# Patient Record
Sex: FEMALE | Race: WHITE | NOT HISPANIC OR LATINO
[De-identification: names, ages, dates, MRNs, and addresses within clinical notes are randomized per-mention and may not be internally consistent; named-entity substitution may affect disease eponyms.]

---

## 2017-01-25 ENCOUNTER — APPOINTMENT (OUTPATIENT)
Dept: SPINE | Facility: CLINIC | Age: 67
End: 2017-01-25

## 2017-01-25 ENCOUNTER — OUTPATIENT (OUTPATIENT)
Dept: OUTPATIENT SERVICES | Facility: HOSPITAL | Age: 67
LOS: 1 days | End: 2017-01-25
Payer: MEDICARE

## 2017-01-25 VITALS
BODY MASS INDEX: 23.79 KG/M2 | HEART RATE: 65 BPM | HEIGHT: 61 IN | WEIGHT: 126 LBS | DIASTOLIC BLOOD PRESSURE: 65 MMHG | SYSTOLIC BLOOD PRESSURE: 109 MMHG

## 2017-01-25 DIAGNOSIS — M99.83 OTHER BIOMECHANICAL LESIONS OF LUMBAR REGION: ICD-10-CM

## 2017-01-25 DIAGNOSIS — M54.5 LOW BACK PAIN: ICD-10-CM

## 2017-01-25 DIAGNOSIS — M43.9 DEFORMING DORSOPATHY, UNSPECIFIED: ICD-10-CM

## 2017-01-25 DIAGNOSIS — Z96.60 PRESENCE OF UNSPECIFIED ORTHOPEDIC JOINT IMPLANT: Chronic | ICD-10-CM

## 2017-01-25 DIAGNOSIS — M43.10 SPONDYLOLISTHESIS, SITE UNSPECIFIED: ICD-10-CM

## 2017-01-25 PROCEDURE — 72082 X-RAY EXAM ENTIRE SPI 2/3 VW: CPT | Mod: 26

## 2017-01-25 PROCEDURE — 72110 X-RAY EXAM L-2 SPINE 4/>VWS: CPT

## 2017-01-25 PROCEDURE — 72100 X-RAY EXAM L-S SPINE 2/3 VWS: CPT | Mod: 26,59

## 2017-01-25 PROCEDURE — 72082 X-RAY EXAM ENTIRE SPI 2/3 VW: CPT

## 2017-01-27 PROBLEM — M43.9 COMPRESSION DEFORMITY OF VERTEBRA: Status: ACTIVE | Noted: 2017-01-27

## 2017-01-27 PROBLEM — M43.10 RETROLISTHESIS OF VERTEBRAE: Status: ACTIVE | Noted: 2017-01-27

## 2017-01-27 PROBLEM — M99.83 LUMBAR FORAMINAL STENOSIS: Status: ACTIVE | Noted: 2017-01-27

## 2017-01-27 RX ORDER — CALCIUM CARBONATE/VITAMIN D3 600MG-5MCG
TABLET ORAL
Refills: 0 | Status: ACTIVE | COMMUNITY

## 2017-01-27 RX ORDER — MULTIVIT-MIN/FOLIC/VIT K/LYCOP 400-300MCG
1000 TABLET ORAL
Refills: 0 | Status: ACTIVE | COMMUNITY

## 2017-03-22 ENCOUNTER — APPOINTMENT (OUTPATIENT)
Dept: OPHTHALMOLOGY | Facility: CLINIC | Age: 67
End: 2017-03-22

## 2017-04-05 ENCOUNTER — APPOINTMENT (OUTPATIENT)
Dept: OPHTHALMOLOGY | Facility: CLINIC | Age: 67
End: 2017-04-05

## 2017-04-12 ENCOUNTER — APPOINTMENT (OUTPATIENT)
Dept: OPHTHALMOLOGY | Facility: CLINIC | Age: 67
End: 2017-04-12

## 2017-04-26 ENCOUNTER — OUTPATIENT (OUTPATIENT)
Dept: OUTPATIENT SERVICES | Facility: HOSPITAL | Age: 67
LOS: 1 days | End: 2017-04-26
Payer: MEDICARE

## 2017-04-26 ENCOUNTER — APPOINTMENT (OUTPATIENT)
Dept: OBGYN | Facility: CLINIC | Age: 67
End: 2017-04-26

## 2017-04-26 VITALS — DIASTOLIC BLOOD PRESSURE: 74 MMHG | WEIGHT: 128 LBS | SYSTOLIC BLOOD PRESSURE: 118 MMHG | BODY MASS INDEX: 24.19 KG/M2

## 2017-04-26 DIAGNOSIS — Z01.419 ENCOUNTER FOR GYNECOLOGICAL EXAMINATION (GENERAL) (ROUTINE) WITHOUT ABNORMAL FINDINGS: ICD-10-CM

## 2017-04-26 DIAGNOSIS — Z96.60 PRESENCE OF UNSPECIFIED ORTHOPEDIC JOINT IMPLANT: Chronic | ICD-10-CM

## 2017-04-26 DIAGNOSIS — N76.0 ACUTE VAGINITIS: ICD-10-CM

## 2017-04-26 DIAGNOSIS — Z87.891 PERSONAL HISTORY OF NICOTINE DEPENDENCE: ICD-10-CM

## 2017-04-26 PROCEDURE — G0439: CPT

## 2017-04-30 ENCOUNTER — FORM ENCOUNTER (OUTPATIENT)
Age: 67
End: 2017-04-30

## 2017-05-01 ENCOUNTER — APPOINTMENT (OUTPATIENT)
Dept: RADIOLOGY | Facility: IMAGING CENTER | Age: 67
End: 2017-05-01

## 2017-05-01 ENCOUNTER — OUTPATIENT (OUTPATIENT)
Dept: OUTPATIENT SERVICES | Facility: HOSPITAL | Age: 67
LOS: 1 days | End: 2017-05-01
Payer: MEDICARE

## 2017-05-01 DIAGNOSIS — M81.0 AGE-RELATED OSTEOPOROSIS WITHOUT CURRENT PATHOLOGICAL FRACTURE: ICD-10-CM

## 2017-05-01 DIAGNOSIS — Z96.60 PRESENCE OF UNSPECIFIED ORTHOPEDIC JOINT IMPLANT: Chronic | ICD-10-CM

## 2017-05-01 PROCEDURE — 77080 DXA BONE DENSITY AXIAL: CPT

## 2017-08-09 ENCOUNTER — APPOINTMENT (OUTPATIENT)
Dept: OPHTHALMOLOGY | Facility: CLINIC | Age: 67
End: 2017-08-09
Payer: MEDICARE

## 2017-08-09 PROCEDURE — 92012 INTRM OPH EXAM EST PATIENT: CPT

## 2017-08-16 ENCOUNTER — APPOINTMENT (OUTPATIENT)
Dept: OPHTHALMOLOGY | Facility: CLINIC | Age: 67
End: 2017-08-16

## 2018-04-11 ENCOUNTER — APPOINTMENT (OUTPATIENT)
Dept: OPHTHALMOLOGY | Facility: CLINIC | Age: 68
End: 2018-04-11
Payer: MEDICARE

## 2018-04-11 DIAGNOSIS — H26.9 UNSPECIFIED CATARACT: ICD-10-CM

## 2018-04-11 DIAGNOSIS — Z78.9 OTHER SPECIFIED HEALTH STATUS: ICD-10-CM

## 2018-04-11 PROCEDURE — 92133 CPTRZD OPH DX IMG PST SGM ON: CPT

## 2018-04-11 PROCEDURE — 92014 COMPRE OPH EXAM EST PT 1/>: CPT

## 2018-04-30 ENCOUNTER — APPOINTMENT (OUTPATIENT)
Dept: OBGYN | Facility: CLINIC | Age: 68
End: 2018-04-30

## 2018-05-14 ENCOUNTER — OUTPATIENT (OUTPATIENT)
Dept: OUTPATIENT SERVICES | Facility: HOSPITAL | Age: 68
LOS: 1 days | End: 2018-05-14
Payer: MEDICARE

## 2018-05-14 ENCOUNTER — APPOINTMENT (OUTPATIENT)
Dept: OBGYN | Facility: CLINIC | Age: 68
End: 2018-05-14
Payer: MEDICARE

## 2018-05-14 VITALS
WEIGHT: 127 LBS | DIASTOLIC BLOOD PRESSURE: 78 MMHG | SYSTOLIC BLOOD PRESSURE: 118 MMHG | BODY MASS INDEX: 23.98 KG/M2 | HEIGHT: 61 IN

## 2018-05-14 DIAGNOSIS — Z96.60 PRESENCE OF UNSPECIFIED ORTHOPEDIC JOINT IMPLANT: Chronic | ICD-10-CM

## 2018-05-14 DIAGNOSIS — Z87.898 PERSONAL HISTORY OF OTHER SPECIFIED CONDITIONS: ICD-10-CM

## 2018-05-14 DIAGNOSIS — M85.80 OTHER SPECIFIED DISORDERS OF BONE DENSITY AND STRUCTURE, UNSPECIFIED SITE: ICD-10-CM

## 2018-05-14 DIAGNOSIS — Z87.891 PERSONAL HISTORY OF NICOTINE DEPENDENCE: ICD-10-CM

## 2018-05-14 DIAGNOSIS — N76.0 ACUTE VAGINITIS: ICD-10-CM

## 2018-05-14 DIAGNOSIS — Z86.19 PERSONAL HISTORY OF OTHER INFECTIOUS AND PARASITIC DISEASES: ICD-10-CM

## 2018-05-14 PROCEDURE — 99397 PER PM REEVAL EST PAT 65+ YR: CPT | Mod: GC

## 2018-05-14 PROCEDURE — G0439: CPT

## 2018-05-14 RX ORDER — DICLOFENAC EPOLAMINE 0.01 G/1
1.3 SYSTEM TOPICAL
Refills: 0 | Status: DISCONTINUED | COMMUNITY
End: 2018-05-14

## 2018-05-15 ENCOUNTER — FORM ENCOUNTER (OUTPATIENT)
Age: 68
End: 2018-05-15

## 2018-05-16 ENCOUNTER — APPOINTMENT (OUTPATIENT)
Dept: ULTRASOUND IMAGING | Facility: IMAGING CENTER | Age: 68
End: 2018-05-16
Payer: MEDICARE

## 2018-05-16 ENCOUNTER — OUTPATIENT (OUTPATIENT)
Dept: OUTPATIENT SERVICES | Facility: HOSPITAL | Age: 68
LOS: 1 days | End: 2018-05-16
Payer: MEDICARE

## 2018-05-16 DIAGNOSIS — Z96.60 PRESENCE OF UNSPECIFIED ORTHOPEDIC JOINT IMPLANT: Chronic | ICD-10-CM

## 2018-05-16 DIAGNOSIS — Z01.411 ENCOUNTER FOR GYNECOLOGICAL EXAMINATION (GENERAL) (ROUTINE) WITH ABNORMAL FINDINGS: ICD-10-CM

## 2018-05-16 PROCEDURE — 76830 TRANSVAGINAL US NON-OB: CPT

## 2018-05-16 PROCEDURE — 76830 TRANSVAGINAL US NON-OB: CPT | Mod: 26

## 2018-05-24 DIAGNOSIS — M85.80 OTHER SPECIFIED DISORDERS OF BONE DENSITY AND STRUCTURE, UNSPECIFIED SITE: ICD-10-CM

## 2018-05-24 DIAGNOSIS — Z01.411 ENCOUNTER FOR GYNECOLOGICAL EXAMINATION (GENERAL) (ROUTINE) WITH ABNORMAL FINDINGS: ICD-10-CM

## 2018-05-24 DIAGNOSIS — Z01.419 ENCOUNTER FOR GYNECOLOGICAL EXAMINATION (GENERAL) (ROUTINE) WITHOUT ABNORMAL FINDINGS: ICD-10-CM

## 2018-05-24 DIAGNOSIS — Z87.891 PERSONAL HISTORY OF NICOTINE DEPENDENCE: ICD-10-CM

## 2018-06-01 ENCOUNTER — APPOINTMENT (OUTPATIENT)
Dept: PULMONOLOGY | Facility: CLINIC | Age: 68
End: 2018-06-01
Payer: MEDICARE

## 2018-06-01 VITALS
WEIGHT: 126 LBS | DIASTOLIC BLOOD PRESSURE: 78 MMHG | HEART RATE: 57 BPM | BODY MASS INDEX: 25.4 KG/M2 | SYSTOLIC BLOOD PRESSURE: 140 MMHG | HEIGHT: 59 IN | OXYGEN SATURATION: 97 %

## 2018-06-01 DIAGNOSIS — R06.2 WHEEZING: ICD-10-CM

## 2018-06-01 DIAGNOSIS — J45.909 UNSPECIFIED ASTHMA, UNCOMPLICATED: ICD-10-CM

## 2018-06-01 PROCEDURE — 94729 DIFFUSING CAPACITY: CPT

## 2018-06-01 PROCEDURE — 94060 EVALUATION OF WHEEZING: CPT

## 2018-06-01 PROCEDURE — 99203 OFFICE O/P NEW LOW 30 MIN: CPT | Mod: 25

## 2018-06-01 PROCEDURE — 94727 GAS DIL/WSHOT DETER LNG VOL: CPT

## 2018-07-26 PROBLEM — M54.5 LUMBAR BACK PAIN: Status: ACTIVE | Noted: 2017-01-25

## 2018-10-03 ENCOUNTER — APPOINTMENT (OUTPATIENT)
Dept: OPHTHALMOLOGY | Facility: CLINIC | Age: 68
End: 2018-10-03

## 2018-11-19 ENCOUNTER — APPOINTMENT (OUTPATIENT)
Dept: OPHTHALMOLOGY | Facility: CLINIC | Age: 68
End: 2018-11-19
Payer: MEDICARE

## 2018-11-19 PROCEDURE — 76514 ECHO EXAM OF EYE THICKNESS: CPT

## 2018-11-19 PROCEDURE — 92012 INTRM OPH EXAM EST PATIENT: CPT

## 2019-05-15 ENCOUNTER — APPOINTMENT (OUTPATIENT)
Dept: OBGYN | Facility: CLINIC | Age: 69
End: 2019-05-15

## 2019-05-15 ENCOUNTER — APPOINTMENT (OUTPATIENT)
Dept: OBGYN | Facility: CLINIC | Age: 69
End: 2019-05-15
Payer: MEDICARE

## 2019-05-15 VITALS
SYSTOLIC BLOOD PRESSURE: 132 MMHG | DIASTOLIC BLOOD PRESSURE: 80 MMHG | BODY MASS INDEX: 25 KG/M2 | HEIGHT: 59 IN | WEIGHT: 124 LBS

## 2019-05-15 VITALS
HEIGHT: 59 IN | BODY MASS INDEX: 25 KG/M2 | DIASTOLIC BLOOD PRESSURE: 80 MMHG | SYSTOLIC BLOOD PRESSURE: 132 MMHG | WEIGHT: 124 LBS

## 2019-05-15 DIAGNOSIS — Z01.419 ENCOUNTER FOR GYNECOLOGICAL EXAMINATION (GENERAL) (ROUTINE) W/OUT ABNORMAL FINDINGS: ICD-10-CM

## 2019-05-15 DIAGNOSIS — Z01.411 ENCOUNTER FOR GYNECOLOGICAL EXAMINATION (GENERAL) (ROUTINE) WITH ABNORMAL FINDINGS: ICD-10-CM

## 2019-05-15 PROCEDURE — 99397 PER PM REEVAL EST PAT 65+ YR: CPT

## 2019-05-15 NOTE — PHYSICAL EXAM
[Awake] : awake [Alert] : alert [Acute Distress] : no acute distress [Nipple Discharge] : no nipple discharge [Mass] : no breast mass [Soft] : soft [Tender] : non tender [Axillary LAD] : no axillary lymphadenopathy [Oriented x3] : oriented to person, place, and time [No Bleeding] : there was no active vaginal bleeding [Normal] : uterus [Uterine Adnexae] : were not tender and not enlarged

## 2019-05-15 NOTE — HISTORY OF PRESENT ILLNESS
[Last Bone Density ___] : Last bone density studies [unfilled] [Last Colonoscopy ___] : Last colonoscopy [unfilled] [Last Pap ___] : Last cervical pap smear was [unfilled] [Postmenopausal] : is postmenopausal

## 2019-05-20 ENCOUNTER — APPOINTMENT (OUTPATIENT)
Dept: OPHTHALMOLOGY | Facility: CLINIC | Age: 69
End: 2019-05-20
Payer: MEDICARE

## 2019-05-20 DIAGNOSIS — H40.003 PREGLAUCOMA, UNSPECIFIED, BILATERAL: ICD-10-CM

## 2019-05-20 PROCEDURE — 92015 DETERMINE REFRACTIVE STATE: CPT

## 2019-05-20 PROCEDURE — ZZZZZ: CPT

## 2019-05-20 PROCEDURE — 92014 COMPRE OPH EXAM EST PT 1/>: CPT

## 2019-05-20 PROCEDURE — 92083 EXTENDED VISUAL FIELD XM: CPT

## 2019-09-05 ENCOUNTER — APPOINTMENT (OUTPATIENT)
Dept: OPHTHALMOLOGY | Facility: CLINIC | Age: 69
End: 2019-09-05
Payer: MEDICARE

## 2019-09-05 ENCOUNTER — NON-APPOINTMENT (OUTPATIENT)
Age: 69
End: 2019-09-05

## 2019-09-05 PROCEDURE — 92012 INTRM OPH EXAM EST PATIENT: CPT

## 2019-10-29 ENCOUNTER — RESULT REVIEW (OUTPATIENT)
Age: 69
End: 2019-10-29

## 2020-04-01 ENCOUNTER — APPOINTMENT (OUTPATIENT)
Dept: OPHTHALMOLOGY | Facility: CLINIC | Age: 70
End: 2020-04-01

## 2020-11-05 ENCOUNTER — NON-APPOINTMENT (OUTPATIENT)
Age: 70
End: 2020-11-05

## 2020-11-05 ENCOUNTER — APPOINTMENT (OUTPATIENT)
Dept: OPHTHALMOLOGY | Facility: CLINIC | Age: 70
End: 2020-11-05
Payer: MEDICARE

## 2020-11-05 PROCEDURE — 92133 CPTRZD OPH DX IMG PST SGM ON: CPT

## 2020-11-05 PROCEDURE — 92014 COMPRE OPH EXAM EST PT 1/>: CPT

## 2020-11-05 PROCEDURE — 99072 ADDL SUPL MATRL&STAF TM PHE: CPT

## 2020-11-05 PROCEDURE — 92083 EXTENDED VISUAL FIELD XM: CPT

## 2020-11-12 ENCOUNTER — RESULT REVIEW (OUTPATIENT)
Age: 70
End: 2020-11-12

## 2021-03-26 ENCOUNTER — NON-APPOINTMENT (OUTPATIENT)
Age: 71
End: 2021-03-26

## 2021-10-02 ENCOUNTER — EMERGENCY (EMERGENCY)
Facility: HOSPITAL | Age: 71
LOS: 1 days | Discharge: HOME | End: 2021-10-02
Attending: EMERGENCY MEDICINE | Admitting: EMERGENCY MEDICINE
Payer: MEDICARE

## 2021-10-02 VITALS
RESPIRATION RATE: 18 BRPM | SYSTOLIC BLOOD PRESSURE: 122 MMHG | HEART RATE: 58 BPM | DIASTOLIC BLOOD PRESSURE: 64 MMHG | OXYGEN SATURATION: 97 %

## 2021-10-02 VITALS
WEIGHT: 119.93 LBS | HEART RATE: 57 BPM | HEIGHT: 59 IN | TEMPERATURE: 98 F | SYSTOLIC BLOOD PRESSURE: 144 MMHG | RESPIRATION RATE: 18 BRPM | OXYGEN SATURATION: 95 % | DIASTOLIC BLOOD PRESSURE: 98 MMHG

## 2021-10-02 DIAGNOSIS — Z96.60 PRESENCE OF UNSPECIFIED ORTHOPEDIC JOINT IMPLANT: Chronic | ICD-10-CM

## 2021-10-02 PROCEDURE — 90471 IMMUNIZATION ADMIN: CPT

## 2021-10-02 PROCEDURE — 72125 CT NECK SPINE W/O DYE: CPT | Mod: MA

## 2021-10-02 PROCEDURE — 99284 EMERGENCY DEPT VISIT MOD MDM: CPT | Mod: 25

## 2021-10-02 PROCEDURE — 12002 RPR S/N/AX/GEN/TRNK2.6-7.5CM: CPT

## 2021-10-02 PROCEDURE — 90715 TDAP VACCINE 7 YRS/> IM: CPT

## 2021-10-02 PROCEDURE — 70450 CT HEAD/BRAIN W/O DYE: CPT | Mod: MA

## 2021-10-02 PROCEDURE — 73502 X-RAY EXAM HIP UNI 2-3 VIEWS: CPT | Mod: 26,RT

## 2021-10-02 PROCEDURE — 73502 X-RAY EXAM HIP UNI 2-3 VIEWS: CPT

## 2021-10-02 PROCEDURE — 70450 CT HEAD/BRAIN W/O DYE: CPT | Mod: 26,MA

## 2021-10-02 PROCEDURE — 99285 EMERGENCY DEPT VISIT HI MDM: CPT | Mod: 25

## 2021-10-02 PROCEDURE — 72125 CT NECK SPINE W/O DYE: CPT | Mod: 26,MA

## 2021-10-02 RX ORDER — TETANUS TOXOID, REDUCED DIPHTHERIA TOXOID AND ACELLULAR PERTUSSIS VACCINE, ADSORBED 5; 2.5; 8; 8; 2.5 [IU]/.5ML; [IU]/.5ML; UG/.5ML; UG/.5ML; UG/.5ML
0.5 SUSPENSION INTRAMUSCULAR ONCE
Refills: 0 | Status: COMPLETED | OUTPATIENT
Start: 2021-10-02 | End: 2021-10-02

## 2021-10-02 RX ORDER — ACETAMINOPHEN 500 MG
650 TABLET ORAL ONCE
Refills: 0 | Status: COMPLETED | OUTPATIENT
Start: 2021-10-02 | End: 2021-10-02

## 2021-10-02 RX ORDER — ALPRAZOLAM 0.25 MG
0.25 TABLET ORAL ONCE
Refills: 0 | Status: DISCONTINUED | OUTPATIENT
Start: 2021-10-02 | End: 2021-10-02

## 2021-10-02 RX ADMIN — Medication 650 MILLIGRAM(S): at 11:04

## 2021-10-02 RX ADMIN — Medication 0.25 MILLIGRAM(S): at 11:04

## 2021-10-02 RX ADMIN — TETANUS TOXOID, REDUCED DIPHTHERIA TOXOID AND ACELLULAR PERTUSSIS VACCINE, ADSORBED 0.5 MILLILITER(S): 5; 2.5; 8; 8; 2.5 SUSPENSION INTRAMUSCULAR at 11:04

## 2021-10-02 NOTE — ED PROVIDER NOTE - ATTENDING CONTRIBUTION TO CARE
Pt s/p trip and fall down 5-7 steps, hit L side of head and R side of pelvis, ambulatory afterwards, small bleeding from scalp laceration.  Pt awake, alert, anxious, no c/t/l spine td, 5/5 motor UE and LE.

## 2021-10-02 NOTE — ED ADULT NURSE NOTE - OBJECTIVE STATEMENT
71 yr old female to ED via EMS s/p fall down 5-7 steps. Pt tripped was impatient to wait for the elevator Denies LOC head lac C/o l hip pain Was able to get up Pt awake alert and orientedx4 Resp even and nonlab denies chest pain or SOb Denies dizziness or lightheadedness GCS 15 Responds to verbal and tactile stimuli.

## 2021-10-02 NOTE — ED PROVIDER NOTE - CLINICAL SUMMARY MEDICAL DECISION MAKING FREE TEXT BOX
Brenden Glover, PGY-2- 71 year old female with a pmhx of anxiety, chronic bronchitis, hypothyroid, s/p fall that occurred prior to arrival. Pt slipped down stairs, hit head, was able to stand up after injury. With pain at back of head, rt hip. Plan to obtain ct head, ct neck, tetanus shot, tylenol, xanax for anxiety. Will need laceration cleaned and likely repaired

## 2021-10-02 NOTE — ED PROVIDER NOTE - OBJECTIVE STATEMENT
71 year old female with a pmhx of hypothyroid, chronic bronchitis, anxiety, is brought to ED by EMS for evaluation of fall. Pt reports was rushing down stairs, slipped, and fell down 6 stairs and hit her head. No LOC. Pt noted blood to back of head. Was able to pull herself up to standing, unsure if she walked. Noting pain to back of head, rt hip. No neck pain. Denies any recent fever, chills, cp, sob, abd pain, vomiting, diarrhea, numbness, tingling. On ASA daily. No other recent falls. Last tetanus unknown.

## 2021-10-02 NOTE — ED PROVIDER NOTE - PHYSICAL EXAMINATION
General: well appearing, no acute distress, AOx3  Skin: no rash, no pallor  Head: normocephalic, open wound to left posterior scalp, bleeding controlled   Eyes: clear conjunctiva, EOMI, PERRL  ENMT: airway patent, no nasal discharge  Cardiovascular: normal rate, normal rhythm, S1/S2  Pulmonary: clear to auscultation bilaterally, no rales, rhonchi, or wheeze  Abdomen: soft, nontender  Musculoskeletal: moving extremities well, no deformity, no c/t/l spine tenderness, no tenderness over chest wall, normal ROM of hips/knees/ankles/shoulders/elbows/wrists, normal ROM of neck   Psych: normal mood, appears anxious

## 2021-10-02 NOTE — ED PROVIDER NOTE - NS ED ROS FT
General: no fever, no chills  Eyes: no vision changes, no eye pain  Cardiovascular: no chest pain, no edema  Respiratory: no cough, no shortness of breath  Gastrointestinal: no abdominal pain, no nausea, no vomiting, no diarrhea  Genitourinary: no dysuria, no hematuria  Musculoskeletal: no muscle pain, +rt hip pain, head pain  Skin: no rash, no lesions  Neuro: no numbness, no tingling  Psych: no depression, no anxiety

## 2021-10-02 NOTE — ED ADULT NURSE NOTE - NSIMPLEMENTINTERV_GEN_ALL_ED
Implemented All Fall Risk Interventions:  [place] to call system. Call bell, personal items and telephone within reach. Instruct patient to call for assistance. Room bathroom lighting operational. Non-slip footwear when patient is off stretcher. Physically safe environment: no spills, clutter or unnecessary equipment. Stretcher in lowest position, wheels locked, appropriate side rails in place. Provide visual cue, wrist band, yellow gown, etc. Monitor gait and stability. Monitor for mental status changes and reorient to person, place, and time. Review medications for side effects contributing to fall risk. Reinforce activity limits and safety measures with patient and family.

## 2021-10-02 NOTE — ED ADULT NURSE NOTE - CAS EDP DISCH TYPE
Consent (Nose)/Introductory Paragraph: The rationale for Mohs was explained to the patient and consent was obtained. The risks, benefits and alternatives to therapy were discussed in detail. Specifically, the risks of nasal deformity, changes in the flow of air through the nose, infection, scarring, bleeding, prolonged wound healing, incomplete removal, allergy to anesthesia, nerve injury and recurrence were addressed. Prior to the procedure, the treatment site was clearly identified and confirmed by the patient using a hand mirror. All components of Universal Protocol/PAUSE Rule completed. Home

## 2021-10-02 NOTE — ED PROVIDER NOTE - NSICDXPASTMEDICALHX_GEN_ALL_CORE_FT
PAST MEDICAL HISTORY:  Anxiety     COPD (chronic obstructive pulmonary disease)     Glaucoma     Hyperlipemia     Hypothyroid     Osteoporosis, postmenopausal

## 2021-10-02 NOTE — ED PROVIDER NOTE - PROGRESS NOTE DETAILS
Brenden Glover, PGY-2- Patient improved in the ED. Laceration repaired. Pt to have staples removed in 5 days. Pt ambulatory in ED. Discussed results of work up with patient. Patient agrees with plan to discharge home. All questions answered regarding plan. Strict return precautions given.

## 2021-10-02 NOTE — ED PROVIDER NOTE - PATIENT PORTAL LINK FT
You can access the FollowMyHealth Patient Portal offered by [facility] by registering at the following website: [email or web address]. By joining [employer] [facility]’s FollowMyHealth portal, you will also be able to view your health information using other applications (apps) compatible with our system.

## 2021-10-02 NOTE — ED PROVIDER NOTE - NSFOLLOWUPINSTRUCTIONS_ED_ALL_ED_FT
1) Follow up with your PCP within 1 week of being seen in the Emergency Department   2) Return to the ED immediately for new or worsening symptoms severe pain, difficulty breathing, unable to walk, fever, redness surrounding wound, drainage from wound   3) Please continue to take any home medications as prescribed  4) Your test results from your ED visit were discussed with you prior to discharge  5) You were provided with a copy of your test results  6) Please have the staples (5) removed in 7 days. You can go to any Emergency Department, urgent care, or your doctor's office. Please take Tylenol as needed for pain. Please follow all pharmacy packaging instructions and warnings.  7) Please see info sheet on staples 1) Follow up with your PCP within 1 week of being seen in the Emergency Department   2) Return to the ED immediately for new or worsening symptoms severe pain, difficulty breathing, unable to walk, fever, redness surrounding wound, drainage from wound   3) Please continue to take any home medications as prescribed  4) Your test results from your ED visit were discussed with you prior to discharge  5) You were provided with a copy of your test results  6) Please have the staples (5) removed in 7 days. You can go to any Emergency Department, urgent care, or your doctor's office. Please take Tylenol as needed for pain. Please follow all pharmacy packaging instructions and warnings. Do not get staples wet.  7) Please see info sheet on staples

## 2021-12-27 ENCOUNTER — TRANSCRIPTION ENCOUNTER (OUTPATIENT)
Age: 71
End: 2021-12-27

## 2022-07-14 ENCOUNTER — EMERGENCY (EMERGENCY)
Facility: HOSPITAL | Age: 72
LOS: 1 days | Discharge: HOME | End: 2022-07-14
Attending: STUDENT IN AN ORGANIZED HEALTH CARE EDUCATION/TRAINING PROGRAM | Admitting: STUDENT IN AN ORGANIZED HEALTH CARE EDUCATION/TRAINING PROGRAM

## 2022-07-14 VITALS
TEMPERATURE: 98 F | HEART RATE: 58 BPM | DIASTOLIC BLOOD PRESSURE: 68 MMHG | OXYGEN SATURATION: 98 % | SYSTOLIC BLOOD PRESSURE: 135 MMHG | RESPIRATION RATE: 16 BRPM | HEIGHT: 59 IN

## 2022-07-14 DIAGNOSIS — Z96.60 PRESENCE OF UNSPECIFIED ORTHOPEDIC JOINT IMPLANT: Chronic | ICD-10-CM

## 2022-07-14 PROCEDURE — 99285 EMERGENCY DEPT VISIT HI MDM: CPT

## 2022-07-14 RX ORDER — LIDOCAINE 4 G/100G
1 CREAM TOPICAL ONCE
Refills: 0 | Status: COMPLETED | OUTPATIENT
Start: 2022-07-14 | End: 2022-07-14

## 2022-07-14 NOTE — ED PROVIDER NOTE - ENMT, MLM
Airway patent, head NC/AT, no ecchymosis noted to face, mild ttp to top of head, no hematoma or ecchymosis noted, no deformity

## 2022-07-14 NOTE — ED PROVIDER NOTE - PROGRESS NOTE DETAILS
Blair Perry MD PGY-2  Received signout on this pt who had +HS, -LOC after being struck by a mirror. CT read unremarkable for acute trauma or fracture. WIll D/C with OP follow-up

## 2022-07-14 NOTE — ED PROVIDER NOTE - OBJECTIVE STATEMENT
73 y/o F PMH COPD c/o posterior head pain and neck pain s/p head injury today. Pt states she was putting clothes away in her drawer when the mirror on top of the dresser fell on her head and neck, pt able to lift off of her and place back on dresser but states pain has been gradually worsening since incident. Denies fever, LOC, change in vision, CP, SOB, abdominal pain, N/V/D, numbness/tingling/weakness.

## 2022-07-14 NOTE — ED PROVIDER NOTE - EXTREMITY EXAM
b/l UE: no obvious swelling, erythema, or deformity, no TTP, FROM, sensation and strength intact/no deformity, pain or tenderness, no restriction of movement

## 2022-07-14 NOTE — ED PROVIDER NOTE - ATTENDING APP SHARED VISIT CONTRIBUTION OF CARE
71 yo f past medical history copd anxiety, hypothyroidism present after mirror fell off dresser hitting her in the head. reports pain on head since. no loc, n/v, numbness, weakness, vision or hearing changes. denies AC use. exam as above. plan: symptom relief prn, CT, reassess.

## 2022-07-14 NOTE — ED PROVIDER NOTE - NSICDXFAMILYHX_GEN_ALL_CORE_FT
Can you clarify as to which medication she might be referring to? Cymbalta? CBD oil?    FAMILY HISTORY:  Family history of breast cancer

## 2022-07-14 NOTE — ED PROVIDER NOTE - NS ED ATTENDING STATEMENT MOD
This was a shared visit with the EDMAR. I reviewed and verified the documentation and independently performed the documented:

## 2022-07-14 NOTE — ED PROVIDER NOTE - CLINICAL SUMMARY MEDICAL DECISION MAKING FREE TEXT BOX
pt with minor head trauma c/o head pain and neck pain, given age will check ct head and neck, pt otherwise well appearing, offered pain control but pt decliend however does except lido patch

## 2022-07-14 NOTE — ED PROVIDER NOTE - NS CPE EDP MUSC CERVICAL LOC
no midline tenderness. no deformity. bilateral paraspinal muscle tenderness to palpation over proximal trapexius and cervical paraspinal muscles. FROM. Pain reproducible with ROM

## 2022-07-14 NOTE — ED ADULT TRIAGE NOTE - CHIEF COMPLAINT QUOTE
Patients dresser mirror tipped over and hit her head today when she opened the drawer. Patient has bump on the top of her head and some neck pain. , no laceration, no loc, no blood thinners.

## 2022-07-14 NOTE — ED PROVIDER NOTE - PATIENT PORTAL LINK FT
You can access the FollowMyHealth Patient Portal offered by [facility] by registering at the following website: [email or web address]. By joining [employer]’s FollowMyHealth portal, you will also be able to view your health information using other applications (apps) compatible with our system.

## 2022-07-14 NOTE — ED PROVIDER NOTE - NSFOLLOWUPINSTRUCTIONS_ED_ALL_ED_FT
You were seen in the Emergency Department after being struck by a falling mirror. Fortunately, we did not identify any life-threatening head or neck trauma that would otherwise require further evaluation in the hospital. Some people experience vague neurologic symptoms after being struck in the head, which may be symptoms of a concussion.    You should return to the Emergency Department if you experience any weakness, numbness, worsening headache, vision changes, or other new or concerning symptoms that you would like evaluated.    [facility]  [facility] [place] [facility] [place]  [address], [address]  [place] [address]  [phone]    What is a concussion?  A concussion is a brain injury that affects how your brain works. It can happen when your brain gets bounced around in your skull after a fall or hit to the head.    What should I do if I think I have a concussion?  Report it    If you play a sport tell your  and parent if you think you or one of your teammates may have a concussion. You won’t play your best if you are not feeling well, and playing with a concussion is dangerous. Encourage your teammates to also report their symptoms.    Get checked out by a doctor    If you think you have a concussion, do not return to play any sports on the day of the injury. Only a doctor or other health care provider can tell if you have a concussion and when it’s OK to return to school and play    Give your brain time to heal    Most concussions get better within a couple of weeks. For some, a concussion can make everyday activities, such as going to school, harder. You may need extra help getting back to your normal activities. Be sure to update your parents and doctor about how you are feeling.    Regarding sports good teammates know: It's better to miss one game than the whole season.    How can I tell if I have a concussion?  You may have a concussion if you have any of these symptoms after a bump, blow, or jolt to the head or body:    * Get a headache.  * Feel dizzy, sluggish, or foggy.  * Be bothered by light or noise.  * Have double or blurry vision.  * Vomit or feel sick to your stomach.  * Have trouble focusing or problems remembering.  * Feel more emotional or “down.”  * Feel confused.  * Have problems with sleep.    A concussion feels different to each person, so it’s important to tell your parents or your doctor how you feel. You might notice concussion symptoms right away, but sometimes it takes hours or days until you notice that something isn’t right.    How can I help my team (For athletes) ?  Protect your brain    All your teammates should avoid hits to the head and follow the rules for safe play to lower chances of getting a concussion.    Be a team player    If one of your teammates has a concussion, tell them that they’re an important part of the team, and they should take the time they need to get better.    The information provided in this document or through linkages to other sites is not a substitute for medical or professional care. Questions about diagnosis and treatment for concussion should be directed to a physician or other health care provider.    To learn more, go towww.cdc.gov/HEADSUP    Centers for Disease Control and Prevention    National Center for Injury Prevention and Control    ADDITIONAL NOTES AND INSTRUCTIONS    Please follow up with your Primary MD in 24-48 hr.  Seek immediate medical care for any new/worsening signs or symptoms.

## 2022-07-15 VITALS
SYSTOLIC BLOOD PRESSURE: 149 MMHG | OXYGEN SATURATION: 100 % | DIASTOLIC BLOOD PRESSURE: 76 MMHG | RESPIRATION RATE: 17 BRPM | HEART RATE: 64 BPM | TEMPERATURE: 98 F

## 2022-07-15 PROCEDURE — G1004: CPT

## 2022-07-15 PROCEDURE — 72125 CT NECK SPINE W/O DYE: CPT | Mod: 26,MG

## 2022-07-15 PROCEDURE — 70450 CT HEAD/BRAIN W/O DYE: CPT | Mod: 26,ME

## 2022-07-15 RX ADMIN — LIDOCAINE 1 PATCH: 4 CREAM TOPICAL at 00:01

## 2022-10-12 ENCOUNTER — APPOINTMENT (OUTPATIENT)
Dept: ORTHOPEDIC SURGERY | Facility: CLINIC | Age: 72
End: 2022-10-12
Payer: MEDICARE

## 2022-12-20 ENCOUNTER — APPOINTMENT (OUTPATIENT)
Dept: ORTHOPEDIC SURGERY | Facility: CLINIC | Age: 72
End: 2022-12-20
Payer: MEDICARE

## 2022-12-27 ENCOUNTER — APPOINTMENT (OUTPATIENT)
Dept: ORTHOPEDIC SURGERY | Facility: CLINIC | Age: 72
End: 2022-12-27
Payer: MEDICARE

## 2023-01-03 ENCOUNTER — APPOINTMENT (OUTPATIENT)
Dept: ORTHOPEDIC SURGERY | Facility: CLINIC | Age: 73
End: 2023-01-03
Payer: MEDICARE

## 2023-01-10 ENCOUNTER — APPOINTMENT (OUTPATIENT)
Dept: ORTHOPEDIC SURGERY | Facility: CLINIC | Age: 73
End: 2023-01-10
Payer: MEDICARE

## 2023-01-17 ENCOUNTER — APPOINTMENT (OUTPATIENT)
Dept: ORTHOPEDIC SURGERY | Facility: CLINIC | Age: 73
End: 2023-01-17
Payer: MEDICARE

## 2023-01-17 VITALS — BODY MASS INDEX: 22.78 KG/M2 | WEIGHT: 113 LBS | HEIGHT: 59 IN

## 2023-01-17 DIAGNOSIS — M24.541 CONTRACTURE, RIGHT HAND: ICD-10-CM

## 2023-01-17 PROCEDURE — 99203 OFFICE O/P NEW LOW 30 MIN: CPT | Mod: 25,57

## 2023-01-17 PROCEDURE — 26340 MANIPULATE FINGER W/ANESTH: CPT | Mod: 59,F2

## 2023-01-17 PROCEDURE — 20550 NJX 1 TENDON SHEATH/LIGAMENT: CPT | Mod: 59,F3

## 2023-01-17 PROCEDURE — 20550 NJX 1 TENDON SHEATH/LIGAMENT: CPT | Mod: 59,F2

## 2023-01-17 PROCEDURE — 20550 NJX 1 TENDON SHEATH/LIGAMENT: CPT | Mod: F7

## 2023-01-17 NOTE — PROCEDURE
[] : The injection was done in 2 phases with the first phase being subcutaneous injection of lidocaine 1.5 cc subcutaneously as anesthetic. When adequate level of anesthesia was achieved, the Celestone injection was undertaken. [3rd] : 3rd finger [4th] : 4th finger

## 2023-01-19 NOTE — PHYSICAL EXAM
[de-identified] : Right hand\par At rest: long finger locked in flexion\par Long finger passively extendable with triggering.\par PIP 3 contracture 30 degrees\par DIP 3 contracture 15 degrees.  \par There is no visible or palpable Dupuytren's cords in the long finger or in any other finger.\par No notable Dupuytren's cords in palm other than nodules at DPC.\par Index, ring, little fingers have full active flexion extension without triggering\par There is no other A1 pulley tenderness or triggering in any other finger, right hand.\par Full active flexion extension of the finger\par Basal joint prominent with first metacarpal adduction and stiffness.  Manipulation slight discomfort.\par No pertinent MP and PIP joint contributory findings. DIP joints: Heberden's nodes all digits\par After cortisone injection from local anesthetic right long finger could be fully extended all 3 joints simultaneously.\par Double triggering was evident.\par \par Left hand\par Long finger in flexion at rest.\par PIP contracture 60 degrees.\par PIP extendable with triggering.\par Ring finger active triggering.  PIP 4: -30 degrees.\par Thumb, index, little finger A1 pulleys nontender with no triggering and full extension \par basal joint less prominent on the left compared to right; no pain with basal joint manipulation\par No pertinent MP and PIP joint contributory findings. DIP joints: Heberden's nodes all digits\par \par **After cortisone injection with local anesthetic long finger was passively extendable fully at all 3 joints\par **However active extension was limited to -30 degrees at the PIP joint.\par **Ring finger was fully extendable.**\par \par Neurologic: Median, ulnar, and radial motor and sensory are intact. \par Phalen's test with median nerve compression: Negative bilaterally.\par Skin: No cyanosis, clubbing, or rashes.\par Vascular: Radial pulses intact.\par Lymphatic: No streaking or epitrochlear adenopathy.\par The patient is awake, alert, and oriented. Affect "nervous" but appropriate. Cooperative.\par

## 2023-01-19 NOTE — HISTORY OF PRESENT ILLNESS
[FreeTextEntry1] : The patient is 73 yo RHD female who presents for hand evaluation.\par Patient has made several office appointments without appearing for any. \par \par This patient has canceled 5 CONSECUTIVE appointments.  \par Patient was scheduled for 10/12/2022 and that appointment was canceled.\par The patient had an appointment scheduled for 12/20/2022.  That appointment was canceled.\par Patient had an appointment scheduled 12/27/2022.  That appointment was canceled.\par The patient had an appointment scheduled for 1/3/2023, but that appointment was cancelled.\par This patient had an appointment for 1/10/2023 but that appointment was canceled.\par \par Pt referred by cardiologist.\par \par TODAY:\par Patient presents for hand evaluation.\par Patient states that "my hands are gnarled" especially when the patient awakens in the morning.\par Patient reports that "it takes 5 or 10 minutes of running warm water" over her fingers to get the fingers into extension.\par Patient recognizes that the fingers are triggering into extension.\par Patient explains that "when I get them going" the patient does not have notable triggering during the day.\par Patient states that "this is been going on for years".\par Patient has not had any other treatment by any other physician.\par Pt "broke my clavicle" in September. Pt has "horrific pain" a few weeks ago.\par Pt's partner was hospitalized.\par Pt states that her mother "had this condition" indicating trigger fingers.\par Pt reports that her sister has "gnarled fingers, too."  However, patient is not aware of triggering versus arthritic deformity in her sister.\par Patient reports normal sensation.  Patient denies exacerbation at night during other times.\par Patient does not have notable pain at finger joints themselves.\par \par \par \par

## 2023-01-19 NOTE — DISCUSSION/SUMMARY
[FreeTextEntry1] : Patient's had trigger fingers in the right long, left long and left ring fingers for many months or possibly years.\par Patient apparently was living with flexion contractures of these 3 fingers R3, L3, L4.  Patient has not had prior treatment and has been somewhat avoiding treatment because of fear of pain.\par I have explained the nature of trigger finger and locked trigger finger to patient.  I have given the patient printed educational material regarding trigger finger.  I reviewed treatment options risks and complications with the patient.  All questions answered.  Following this, 1 all 3 fingers had been anesthetized with lidocaine subcutaneously and additional bupivacaine had been injected with cortisone for trigger finger, all 3 fingers were passively manipulated into full extension.  Patient was then able to actively flex and extend fingers.  Patient was able to overcome the locking and actively extend PIP joints.  \par After passive manipulation all IP joints were fully extendable.\par However with active extension long finger PIP extension -30 degrees only.  Patient could not extend left long finger actively beyond -30 degrees.\par Other fingers could be actively extended.\par Prognosis limited.\par Patient should do home exercise program to extend fingers to try to correct flexion contracture especially left long finger PIP joint.\par I am concerned about the patient's ability to perform exercises possibly because of lack of full understanding even though active, active assistive, passive range of motion program was explained, demonstrated, properly executed by patient.\par Patient would benefit from supervised hand therapy least once per week for the next several weeks.\par If triggering subsides, even if there is some PIP flexion contracture, but no pain, I believe that no additional treatment would be required if the patient has satisfactory function.\par If the patient is concerned about function, patient should return for reassessment.  If triggering continues A1 pulley release will most likely be indicated.\par Prognosis limited.\par Patient had canceled 5 consecutive office appointments.  I have advised patient that if she continues to cancel appointments and I will transfer her care to another physician.\par \par The following post-injection instructions were given to the patient: The patient should be cautious in activities for two weeks and then increase to full activities.  Thereafter, the patient should return if symptoms continue, or if they michael and recur subsequently. If symptoms do not recur, the patient need not return and can be seen on an as needed basis. The patient has expressed understanding and acceptance of analysis, treatment, and recommendations.  All questions answered.

## 2023-03-22 ENCOUNTER — APPOINTMENT (OUTPATIENT)
Dept: PULMONOLOGY | Facility: CLINIC | Age: 73
End: 2023-03-22

## 2023-05-20 NOTE — HISTORY OF PRESENT ILLNESS
[FreeTextEntry1] : Patient seen on 1 prior occasion 1/17/2023.\par Prior to that visit the patient had canceled 5 consecutive appointments between October 2022 and January 2023.\par Patient complained that her hands were "gnarled" and that it took 5 to 10 minutes of running warm water over her fingers to mobilize them.\par Examination demonstrated trigger finger of right long, left long, and left ring fingers with flexion contractures..  Because of prolonged duration of symptomatology patient was advised the prognosis was guarded.  These fingers were treated with cortisone injections and finger manipulation to try to mobilize digits.\par After injection and manipulation the 3 fingers could be fully extended passively but the patient lacked active extension of left long finger to -30 degrees and there is double triggering noted in the right long finger.\par \par Patient was contacted by telephone 2/2/2023.  Patient reported some improvement but also considerable stiffness.  I recommended and prescribed hand therapy and urged the patient to return for follow-up.\par However, patient has not returned since that single office visit, 1/17/2023.\par \par TODAY:\par Patient returns for follow-up assessment and treatment.\par

## 2023-05-23 ENCOUNTER — APPOINTMENT (OUTPATIENT)
Dept: ORTHOPEDIC SURGERY | Facility: CLINIC | Age: 73
End: 2023-05-23

## 2023-06-14 ENCOUNTER — APPOINTMENT (OUTPATIENT)
Dept: ORTHOPEDIC SURGERY | Facility: CLINIC | Age: 73
End: 2023-06-14

## 2023-07-07 ENCOUNTER — APPOINTMENT (OUTPATIENT)
Dept: OTOLARYNGOLOGY | Facility: CLINIC | Age: 73
End: 2023-07-07

## 2023-07-14 ENCOUNTER — APPOINTMENT (OUTPATIENT)
Dept: ORTHOPEDIC SURGERY | Facility: CLINIC | Age: 73
End: 2023-07-14
Payer: MEDICARE

## 2023-07-14 PROCEDURE — 99204 OFFICE O/P NEW MOD 45 MIN: CPT | Mod: 25

## 2023-07-14 PROCEDURE — 20550 NJX 1 TENDON SHEATH/LIGAMENT: CPT | Mod: F2,F3

## 2023-08-23 ENCOUNTER — APPOINTMENT (OUTPATIENT)
Dept: OTOLARYNGOLOGY | Facility: CLINIC | Age: 73
End: 2023-08-23
Payer: MEDICARE

## 2023-08-23 VITALS — SYSTOLIC BLOOD PRESSURE: 103 MMHG | DIASTOLIC BLOOD PRESSURE: 68 MMHG | HEART RATE: 65 BPM

## 2023-08-23 DIAGNOSIS — H90.3 SENSORINEURAL HEARING LOSS, BILATERAL: ICD-10-CM

## 2023-08-23 PROCEDURE — 92557 COMPREHENSIVE HEARING TEST: CPT

## 2023-08-23 PROCEDURE — 99203 OFFICE O/P NEW LOW 30 MIN: CPT

## 2023-08-23 PROCEDURE — 92567 TYMPANOMETRY: CPT

## 2023-08-23 RX ORDER — ROSUVASTATIN CALCIUM 5 MG/1
TABLET, FILM COATED ORAL
Refills: 0 | Status: ACTIVE | COMMUNITY

## 2023-08-24 NOTE — ASSESSMENT
[FreeTextEntry1] : 73 year old female with bilateral SNHL - referred for hearing aids.   She also inquired about nonsurgical facial rejuvenation - we discussed botox and fillers. She will think about this for midface and cheek augmentation and call to schedule.

## 2023-08-24 NOTE — HISTORY OF PRESENT ILLNESS
[de-identified] : 73 year old female presents for gradual hearing loss for about 10 years.  Has trouble hearing especially worsened with background noise (ex. if water running, unable to hear partner next to her)  Previously followed up with ENT over 10 years ago, who recommended hearing aid.  No recent audio test done since.  Family history of hearing loss - mother needed hearing aids, sister wears hearing aids.  Patient denies otalgia, otorrhea, ear infections, tinnitus, dizziness, vertigo, headaches related to hearing.  [Hearing Loss] : hearing loss

## 2023-09-05 ENCOUNTER — APPOINTMENT (OUTPATIENT)
Age: 73
End: 2023-09-05
Payer: COMMERCIAL

## 2023-09-05 PROCEDURE — PIP: CUSTOM

## 2023-09-20 ENCOUNTER — APPOINTMENT (OUTPATIENT)
Dept: ENDOCRINOLOGY | Facility: CLINIC | Age: 73
End: 2023-09-20
Payer: MEDICARE

## 2023-09-20 VITALS
OXYGEN SATURATION: 98 % | WEIGHT: 112 LBS | BODY MASS INDEX: 22.58 KG/M2 | DIASTOLIC BLOOD PRESSURE: 72 MMHG | SYSTOLIC BLOOD PRESSURE: 138 MMHG | HEIGHT: 59.06 IN | HEART RATE: 56 BPM

## 2023-09-20 DIAGNOSIS — M81.0 AGE-RELATED OSTEOPOROSIS W/OUT CURRENT PATHOLOGICAL FRACTURE: ICD-10-CM

## 2023-09-20 PROCEDURE — 99204 OFFICE O/P NEW MOD 45 MIN: CPT

## 2023-09-21 RX ORDER — LIDOCAINE 5% 700 MG/1
5 PATCH TOPICAL
Refills: 0 | Status: DISCONTINUED | COMMUNITY
End: 2023-09-21

## 2023-10-03 ENCOUNTER — APPOINTMENT (OUTPATIENT)
Age: 73
End: 2023-10-03
Payer: COMMERCIAL

## 2023-10-03 PROCEDURE — PIP: CUSTOM

## 2023-10-11 ENCOUNTER — APPOINTMENT (OUTPATIENT)
Dept: ENDOCRINOLOGY | Facility: CLINIC | Age: 73
End: 2023-10-11

## 2023-10-19 ENCOUNTER — APPOINTMENT (OUTPATIENT)
Age: 73
End: 2023-10-19
Payer: SELF-PAY

## 2023-10-19 PROCEDURE — PIP: CUSTOM

## 2023-10-23 ENCOUNTER — APPOINTMENT (OUTPATIENT)
Age: 73
End: 2023-10-23
Payer: SELF-PAY

## 2023-10-23 PROCEDURE — PIP: CUSTOM

## 2023-10-23 PROCEDURE — INCR: CUSTOM

## 2023-10-25 ENCOUNTER — APPOINTMENT (OUTPATIENT)
Age: 73
End: 2023-10-25

## 2023-10-30 ENCOUNTER — APPOINTMENT (OUTPATIENT)
Age: 73
End: 2023-10-30

## 2023-10-30 ENCOUNTER — APPOINTMENT (OUTPATIENT)
Age: 73
End: 2023-10-30
Payer: SELF-PAY

## 2023-10-30 PROCEDURE — D1110 PROPHYLAXIS - ADULT: CPT

## 2023-11-13 ENCOUNTER — APPOINTMENT (OUTPATIENT)
Age: 73
End: 2023-11-13
Payer: SELF-PAY

## 2023-11-13 PROCEDURE — NTX: CUSTOM

## 2023-11-14 ENCOUNTER — APPOINTMENT (OUTPATIENT)
Dept: PHARMACY | Facility: CLINIC | Age: 73
End: 2023-11-14
Payer: SELF-PAY

## 2023-11-14 PROCEDURE — V5010 ASSESSMENT FOR HEARING AID: CPT | Mod: NC

## 2023-11-29 RX ORDER — DENOSUMAB 60 MG/ML
60 INJECTION SUBCUTANEOUS
Qty: 1 | Refills: 1 | Status: ACTIVE | COMMUNITY
Start: 2023-09-28 | End: 1900-01-01

## 2023-12-06 RX ORDER — ALENDRONATE SODIUM 70 MG/1
70 TABLET ORAL
Qty: 12 | Refills: 3 | Status: ACTIVE | COMMUNITY
Start: 2023-12-06 | End: 1900-01-01

## 2023-12-07 ENCOUNTER — APPOINTMENT (OUTPATIENT)
Age: 73
End: 2023-12-07
Payer: SELF-PAY

## 2023-12-07 ENCOUNTER — APPOINTMENT (OUTPATIENT)
Dept: ENDOCRINOLOGY | Facility: CLINIC | Age: 73
End: 2023-12-07

## 2023-12-07 PROCEDURE — NTX: CUSTOM

## 2023-12-20 ENCOUNTER — APPOINTMENT (OUTPATIENT)
Age: 73
End: 2023-12-20

## 2024-01-03 ENCOUNTER — APPOINTMENT (OUTPATIENT)
Age: 74
End: 2024-01-03

## 2024-01-19 ENCOUNTER — APPOINTMENT (OUTPATIENT)
Age: 74
End: 2024-01-19

## 2024-01-26 ENCOUNTER — APPOINTMENT (OUTPATIENT)
Age: 74
End: 2024-01-26
Payer: SELF-PAY

## 2024-01-26 PROCEDURE — NTX: CUSTOM

## 2024-02-02 ENCOUNTER — APPOINTMENT (OUTPATIENT)
Dept: ORTHOPEDIC SURGERY | Facility: CLINIC | Age: 74
End: 2024-02-02
Payer: MEDICARE

## 2024-02-02 DIAGNOSIS — M65.331 TRIGGER FINGER, RIGHT MIDDLE FINGER: ICD-10-CM

## 2024-02-02 PROCEDURE — 99214 OFFICE O/P EST MOD 30 MIN: CPT | Mod: 25

## 2024-02-05 ENCOUNTER — APPOINTMENT (OUTPATIENT)
Dept: GASTROENTEROLOGY | Facility: CLINIC | Age: 74
End: 2024-02-05
Payer: MEDICARE

## 2024-02-05 VITALS
SYSTOLIC BLOOD PRESSURE: 126 MMHG | BODY MASS INDEX: 22.98 KG/M2 | RESPIRATION RATE: 14 BRPM | TEMPERATURE: 97 F | HEIGHT: 59.06 IN | OXYGEN SATURATION: 98 % | HEART RATE: 67 BPM | WEIGHT: 114 LBS | DIASTOLIC BLOOD PRESSURE: 64 MMHG

## 2024-02-05 DIAGNOSIS — K59.09 OTHER CONSTIPATION: ICD-10-CM

## 2024-02-05 DIAGNOSIS — Z12.11 ENCOUNTER FOR SCREENING FOR MALIGNANT NEOPLASM OF COLON: ICD-10-CM

## 2024-02-05 DIAGNOSIS — H40.9 UNSPECIFIED GLAUCOMA: ICD-10-CM

## 2024-02-05 PROCEDURE — 99204 OFFICE O/P NEW MOD 45 MIN: CPT

## 2024-02-05 RX ORDER — SODIUM SULFATE, POTASSIUM SULFATE AND MAGNESIUM SULFATE 1.6; 3.13; 17.5 G/177ML; G/177ML; G/177ML
17.5-3.13-1.6 SOLUTION ORAL
Qty: 354 | Refills: 0 | Status: ACTIVE | COMMUNITY
Start: 2024-02-05 | End: 1900-01-01

## 2024-02-05 NOTE — ASSESSMENT
[FreeTextEntry1] : 73 yo female, last colonoscopy age 68 with bening polyps. Sees Dr. Tran for GYN. Has baseline constipation. Uses zoloft for anxiety. No weight loss.   IMP: colon cancer screening chronic constipatoin acquired hypothryoidism  PLAN: She was advised to undergo colonoscopy to which she  agreed. The procedure will be performed in [facility] with the assistance of an anesthesiologist. The procedure was explained in detail and she understood the risks of the procedure not limited  to infection, bleeding, perforation, risk of anesthesia and risk of IV site problems,emergency surgery, missed lesions  or non-diagnosis of colon cancer. She  was advised that she could not drive home alone, if the patient chooses to receive sedation. Further diagnostic and treatment recommendations will be based upon the procedure and any biopsies, if they are taken.

## 2024-02-05 NOTE — PHYSICAL EXAM
[Alert] : alert [Normal Voice/Communication] : normal voice/communication [Healthy Appearing] : healthy appearing [No Acute Distress] : no acute distress [Sclera] : the sclera and conjunctiva were normal [Hearing Threshold Finger Rub Not [place]] : hearing was normal [Normal Lips/Gums] : the lips and gums were normal [Oropharynx] : the oropharynx was normal [Normal Appearance] : the appearance of the neck was normal [No Neck Mass] : no neck mass was observed [No Respiratory Distress] : no respiratory distress [No Acc Muscle Use] : no accessory muscle use [Respiration, Rhythm And Depth] : normal respiratory rhythm and effort [Auscultation Breath Sounds / Voice Sounds] : lungs were clear to auscultation bilaterally [Heart Rate And Rhythm] : heart rate was normal and rhythm regular [Normal S1, S2] : normal S1 and S2 [Murmurs] : no murmurs [Bowel Sounds] : normal bowel sounds [Abdomen Tenderness] : non-tender [No Masses] : no abdominal mass palpated [Abdomen Soft] : soft [] : no hepatosplenomegaly [Oriented To Time, Place, And Person] : oriented to person, place, and time

## 2024-02-05 NOTE — HISTORY OF PRESENT ILLNESS
[FreeTextEntry1] : 75 yo female, last colonoscopy age 68 with bening polyps. Sees Dr. Tran for GYN. Has baseline constipation. Uses zoloft for anxiety. No weight loss. Hx of low thyroid followed by Dr. Milton.

## 2024-02-09 ENCOUNTER — APPOINTMENT (OUTPATIENT)
Age: 74
End: 2024-02-09
Payer: COMMERCIAL

## 2024-02-09 PROCEDURE — D7140: CPT

## 2024-02-12 ENCOUNTER — APPOINTMENT (OUTPATIENT)
Age: 74
End: 2024-02-12
Payer: COMMERCIAL

## 2024-02-12 PROCEDURE — 99024 POSTOP FOLLOW-UP VISIT: CPT

## 2024-03-18 ENCOUNTER — APPOINTMENT (OUTPATIENT)
Age: 74
End: 2024-03-18
Payer: COMMERCIAL

## 2024-03-18 PROCEDURE — D0120: CPT

## 2024-03-18 PROCEDURE — D1110 PROPHYLAXIS - ADULT: CPT

## 2024-03-21 ENCOUNTER — APPOINTMENT (OUTPATIENT)
Age: 74
End: 2024-03-21
Payer: COMMERCIAL

## 2024-03-21 PROCEDURE — PIP: CUSTOM

## 2024-03-27 ENCOUNTER — APPOINTMENT (OUTPATIENT)
Dept: ORTHOPEDIC SURGERY | Facility: CLINIC | Age: 74
End: 2024-03-27
Payer: MEDICARE

## 2024-03-27 PROCEDURE — 26055 INCISE FINGER TENDON SHEATH: CPT | Mod: F2,F3

## 2024-04-08 ENCOUNTER — APPOINTMENT (OUTPATIENT)
Age: 74
End: 2024-04-08

## 2024-04-15 ENCOUNTER — APPOINTMENT (OUTPATIENT)
Dept: ORTHOPEDIC SURGERY | Facility: CLINIC | Age: 74
End: 2024-04-15
Payer: MEDICARE

## 2024-04-15 PROCEDURE — 99024 POSTOP FOLLOW-UP VISIT: CPT

## 2024-05-20 ENCOUNTER — APPOINTMENT (OUTPATIENT)
Age: 74
End: 2024-05-20
Payer: COMMERCIAL

## 2024-05-20 PROCEDURE — PLD1: CUSTOM

## 2024-05-28 ENCOUNTER — RX RENEWAL (OUTPATIENT)
Age: 74
End: 2024-05-28

## 2024-05-28 RX ORDER — MELOXICAM 15 MG/1
15 TABLET ORAL DAILY
Qty: 14 | Refills: 1 | Status: ACTIVE | COMMUNITY
Start: 2024-02-02 | End: 1900-01-01

## 2024-05-31 ENCOUNTER — APPOINTMENT (OUTPATIENT)
Age: 74
End: 2024-05-31

## 2024-05-31 ENCOUNTER — APPOINTMENT (OUTPATIENT)
Age: 74
End: 2024-05-31
Payer: COMMERCIAL

## 2024-05-31 PROCEDURE — PIP: CUSTOM

## 2024-06-06 ENCOUNTER — APPOINTMENT (OUTPATIENT)
Dept: ORTHOPEDIC SURGERY | Facility: CLINIC | Age: 74
End: 2024-06-06

## 2024-06-06 DIAGNOSIS — M24.542 CONTRACTURE, LEFT HAND: ICD-10-CM

## 2024-06-06 PROCEDURE — 99024 POSTOP FOLLOW-UP VISIT: CPT

## 2024-06-06 PROCEDURE — 20550 NJX 1 TENDON SHEATH/LIGAMENT: CPT | Mod: F2,58

## 2024-06-13 ENCOUNTER — APPOINTMENT (OUTPATIENT)
Dept: GASTROENTEROLOGY | Facility: AMBULATORY SURGERY CENTER | Age: 74
End: 2024-06-13
Payer: MEDICARE

## 2024-06-13 PROCEDURE — 45378 DIAGNOSTIC COLONOSCOPY: CPT

## 2024-06-25 ENCOUNTER — NON-APPOINTMENT (OUTPATIENT)
Age: 74
End: 2024-06-25

## 2024-06-27 ENCOUNTER — APPOINTMENT (OUTPATIENT)
Age: 74
End: 2024-06-27

## 2024-06-27 PROCEDURE — D5214: CPT

## 2024-06-27 PROCEDURE — PLD3: CUSTOM

## 2024-07-02 ENCOUNTER — APPOINTMENT (OUTPATIENT)
Dept: ORTHOPEDIC SURGERY | Facility: CLINIC | Age: 74
End: 2024-07-02
Payer: MEDICARE

## 2024-07-02 DIAGNOSIS — M65.332 TRIGGER FINGER, LEFT MIDDLE FINGER: ICD-10-CM

## 2024-07-02 DIAGNOSIS — M65.342 TRIGGER FINGER, LEFT RING FINGER: ICD-10-CM

## 2024-07-02 PROCEDURE — 99213 OFFICE O/P EST LOW 20 MIN: CPT | Mod: NC,25

## 2024-07-11 ENCOUNTER — APPOINTMENT (OUTPATIENT)
Age: 74
End: 2024-07-11

## 2024-07-15 ENCOUNTER — APPOINTMENT (OUTPATIENT)
Age: 74
End: 2024-07-15
Payer: SELF-PAY

## 2024-07-15 PROCEDURE — PIP: CUSTOM

## 2024-07-29 ENCOUNTER — APPOINTMENT (OUTPATIENT)
Age: 74
End: 2024-07-29
Payer: SELF-PAY

## 2024-07-29 ENCOUNTER — APPOINTMENT (OUTPATIENT)
Age: 74
End: 2024-07-29

## 2024-07-29 PROCEDURE — PIP: CUSTOM

## 2024-08-14 ENCOUNTER — APPOINTMENT (OUTPATIENT)
Dept: GASTROENTEROLOGY | Facility: CLINIC | Age: 74
End: 2024-08-14

## 2024-08-19 ENCOUNTER — APPOINTMENT (OUTPATIENT)
Age: 74
End: 2024-08-19
Payer: SELF-PAY

## 2024-08-19 PROCEDURE — PIP: CUSTOM

## 2024-08-23 ENCOUNTER — APPOINTMENT (OUTPATIENT)
Age: 74
End: 2024-08-23
Payer: SELF-PAY

## 2024-08-23 PROCEDURE — D0171: CPT | Mod: NC

## 2024-08-29 ENCOUNTER — APPOINTMENT (OUTPATIENT)
Age: 74
End: 2024-08-29
Payer: SELF-PAY

## 2024-08-29 PROCEDURE — D0171: CPT | Mod: NC

## 2024-09-19 ENCOUNTER — APPOINTMENT (OUTPATIENT)
Age: 74
End: 2024-09-19
Payer: COMMERCIAL

## 2024-09-19 PROCEDURE — PIP: CUSTOM

## 2024-09-26 ENCOUNTER — APPOINTMENT (OUTPATIENT)
Age: 74
End: 2024-09-26

## 2024-10-01 ENCOUNTER — APPOINTMENT (OUTPATIENT)
Dept: ORTHOPEDIC SURGERY | Facility: CLINIC | Age: 74
End: 2024-10-01
Payer: MEDICARE

## 2024-10-01 DIAGNOSIS — M65.332 TRIGGER FINGER, LEFT MIDDLE FINGER: ICD-10-CM

## 2024-10-01 DIAGNOSIS — M24.542 CONTRACTURE, LEFT HAND: ICD-10-CM

## 2024-10-01 DIAGNOSIS — M65.342 TRIGGER FINGER, LEFT RING FINGER: ICD-10-CM

## 2024-10-01 PROCEDURE — 99214 OFFICE O/P EST MOD 30 MIN: CPT | Mod: 25

## 2024-10-15 ENCOUNTER — APPOINTMENT (OUTPATIENT)
Age: 74
End: 2024-10-15

## 2024-10-22 ENCOUNTER — APPOINTMENT (OUTPATIENT)
Age: 74
End: 2024-10-22
Payer: COMMERCIAL

## 2024-10-22 PROCEDURE — D9944: CPT

## 2024-11-14 ENCOUNTER — APPOINTMENT (OUTPATIENT)
Age: 74
End: 2024-11-14
Payer: COMMERCIAL

## 2024-11-14 PROCEDURE — D1110 PROPHYLAXIS - ADULT: CPT

## 2024-11-14 PROCEDURE — D0120: CPT

## 2024-12-30 ENCOUNTER — APPOINTMENT (OUTPATIENT)
Age: 74
End: 2024-12-30
Payer: SELF-PAY

## 2024-12-30 PROCEDURE — PIP: CUSTOM

## 2025-02-24 ENCOUNTER — APPOINTMENT (OUTPATIENT)
Dept: OTOLARYNGOLOGY | Facility: CLINIC | Age: 75
End: 2025-02-24
Payer: MEDICARE

## 2025-02-24 ENCOUNTER — NON-APPOINTMENT (OUTPATIENT)
Age: 75
End: 2025-02-24

## 2025-02-24 VITALS
BODY MASS INDEX: 22.58 KG/M2 | WEIGHT: 112 LBS | HEIGHT: 59 IN | OXYGEN SATURATION: 96 % | HEART RATE: 61 BPM | DIASTOLIC BLOOD PRESSURE: 77 MMHG | SYSTOLIC BLOOD PRESSURE: 131 MMHG

## 2025-02-24 PROCEDURE — 92567 TYMPANOMETRY: CPT

## 2025-02-24 PROCEDURE — 99213 OFFICE O/P EST LOW 20 MIN: CPT

## 2025-02-24 PROCEDURE — 92557 COMPREHENSIVE HEARING TEST: CPT

## 2025-02-24 RX ORDER — KETOCONAZOLE 20 MG/G
2 CREAM TOPICAL
Qty: 60 | Refills: 0 | Status: ACTIVE | COMMUNITY
Start: 2025-02-18

## 2025-02-24 RX ORDER — CLOTRIMAZOLE AND BETAMETHASONE DIPROPIONATE 10; .5 MG/ML; MG/ML
1-0.05 LOTION TOPICAL
Qty: 30 | Refills: 0 | Status: ACTIVE | COMMUNITY
Start: 2024-12-10

## 2025-02-24 RX ORDER — HYDROXYZINE HYDROCHLORIDE 25 MG/1
25 TABLET ORAL
Qty: 30 | Refills: 0 | Status: ACTIVE | COMMUNITY
Start: 2024-11-04

## 2025-02-24 RX ORDER — FLUTICASONE PROPIONATE AND SALMETEROL 100; 50 UG/1; UG/1
100-50 POWDER RESPIRATORY (INHALATION)
Qty: 180 | Refills: 0 | Status: ACTIVE | COMMUNITY
Start: 2025-01-29

## 2025-02-24 RX ORDER — CLOBETASOL PROPIONATE CREAM USP, 0.05% 0.5 MG/G
0.05 CREAM TOPICAL
Qty: 60 | Refills: 0 | Status: ACTIVE | COMMUNITY
Start: 2024-11-04

## 2025-02-24 RX ORDER — LEVOTHYROXINE SODIUM 0.07 MG/1
75 TABLET ORAL
Qty: 90 | Refills: 0 | Status: ACTIVE | COMMUNITY
Start: 2024-11-11

## 2025-02-24 RX ORDER — CELECOXIB 200 MG/1
200 CAPSULE ORAL
Qty: 15 | Refills: 0 | Status: ACTIVE | COMMUNITY
Start: 2025-02-10

## 2025-02-24 RX ORDER — ROSUVASTATIN CALCIUM 40 MG/1
40 TABLET, FILM COATED ORAL
Qty: 90 | Refills: 0 | Status: ACTIVE | COMMUNITY
Start: 2024-07-23

## 2025-02-25 ENCOUNTER — APPOINTMENT (OUTPATIENT)
Age: 75
End: 2025-02-25

## 2025-03-03 ENCOUNTER — APPOINTMENT (OUTPATIENT)
Age: 75
End: 2025-03-03
Payer: SELF-PAY

## 2025-03-03 PROCEDURE — D5422: CPT

## 2025-03-17 ENCOUNTER — APPOINTMENT (OUTPATIENT)
Age: 75
End: 2025-03-17
Payer: SELF-PAY

## 2025-03-17 PROCEDURE — D1110 PROPHYLAXIS - ADULT: CPT

## 2025-04-07 ENCOUNTER — NON-APPOINTMENT (OUTPATIENT)
Age: 75
End: 2025-04-07

## 2025-04-09 ENCOUNTER — APPOINTMENT (OUTPATIENT)
Dept: ORTHOPEDIC SURGERY | Facility: CLINIC | Age: 75
End: 2025-04-09

## 2025-04-22 ENCOUNTER — APPOINTMENT (OUTPATIENT)
Dept: ORTHOPEDIC SURGERY | Facility: CLINIC | Age: 75
End: 2025-04-22

## 2025-05-13 ENCOUNTER — APPOINTMENT (OUTPATIENT)
Age: 75
End: 2025-05-13
Payer: COMMERCIAL

## 2025-05-13 PROCEDURE — D0120: CPT

## 2025-05-13 PROCEDURE — D0274: CPT

## 2025-05-13 PROCEDURE — D0220: CPT

## 2025-05-13 PROCEDURE — D0230: CPT

## 2025-07-14 ENCOUNTER — APPOINTMENT (OUTPATIENT)
Age: 75
End: 2025-07-14
Payer: SELF-PAY

## 2025-07-14 PROCEDURE — D1110 PROPHYLAXIS - ADULT: CPT

## 2025-07-24 ENCOUNTER — APPOINTMENT (OUTPATIENT)
Dept: ORTHOPEDIC SURGERY | Facility: CLINIC | Age: 75
End: 2025-07-24

## 2025-07-29 ENCOUNTER — APPOINTMENT (OUTPATIENT)
Age: 75
End: 2025-07-29
Payer: MEDICARE

## 2025-07-29 ENCOUNTER — NON-APPOINTMENT (OUTPATIENT)
Age: 75
End: 2025-07-29

## 2025-07-29 PROCEDURE — 92012 INTRM OPH EXAM EST PATIENT: CPT

## 2025-07-29 PROCEDURE — 92250 FUNDUS PHOTOGRAPHY W/I&R: CPT

## 2025-08-04 ENCOUNTER — APPOINTMENT (OUTPATIENT)
Dept: ORTHOPEDIC SURGERY | Facility: CLINIC | Age: 75
End: 2025-08-04